# Patient Record
Sex: MALE | Race: WHITE | NOT HISPANIC OR LATINO | Employment: STUDENT | URBAN - METROPOLITAN AREA
[De-identification: names, ages, dates, MRNs, and addresses within clinical notes are randomized per-mention and may not be internally consistent; named-entity substitution may affect disease eponyms.]

---
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
## 2024-11-06 NOTE — PROGRESS NOTES
"Gerardo is a 13 y.o. year old male    Chief Complaint   Patient presents with   • Knee Injury     F/U R knee MRI results        History of Present Illness   HPI   1.  Follow-up right knee injury, patient states he has not had any more popping or pain posterior medial.  He has had for some time pain over the inferior pole patella bilaterally.  Worse after jumping.  Patient is a .  The mechanical symptoms of either knee.    I have reviewed the patient's medical, family, and social history in detail and updated the computerized patient record.    Review of Systems   Constitutional: Negative for fever.   Musculoskeletal:        Per HPI   Skin: Negative for wound.   Neurological: Negative for numbness.   All other systems reviewed and are negative.      /64 (BP Location: Left arm, Patient Position: Sitting, Cuff Size: Adult)   Ht 172.1 cm (67.76\")   Wt 76.2 kg (168 lb)   BMI 25.73 kg/m²      Physical Exam   Constitutional: He is oriented to person, place, and time. He appears well-developed and well-nourished.   HENT:   Head: Normocephalic and atraumatic.   Eyes: Conjunctivae and EOM are normal. Pupils are equal, round, and reactive to light.   Cardiovascular:   No peripheral edema   Pulmonary/Chest: Effort normal.   Musculoskeletal:   Patient has mild tenderness and crepitus over the inferior pole patella on the left at the patella tendon, minimal discomfort with resisted knee extension.    Right knee normal in general appearance, no effusion or erythema.  Negative Lockman, negative Cynthia.  Patient does have some mild discomfort to palpation over the inferior medial pole patella as well as over the patella tendon.  Negative Jd sign.   Neurological: He is alert and oriented to person, place, and time.   Skin: Skin is warm and dry.   Psychiatric: He has a normal mood and affect. His behavior is normal.   Vitals reviewed.    Reviewed MRI images right knee 2/25/2019, there is a significant " osteochondral lesion of the inferior medial pole of the patella.    Current Outpatient Medications:   •  albuterol (PROAIR RESPICLICK) 108 (90 BASE) MCG/ACT inhaler, Inhale 2 puffs Every 4 (Four) Hours As Needed for Wheezing., Disp: 1 inhaler, Rfl: 0  •  fluticasone (FLONASE) 50 MCG/ACT nasal spray, 1 spray into the nostril(s) as directed by provider Daily., Disp: , Rfl:   •  levalbuterol (XOPENEX HFA) 45 MCG/ACT inhaler, , Disp: , Rfl:   •  MYORISAN 40 MG capsule, , Disp: , Rfl: 0  •  ONEXTON 1.2-3.75 % gel, , Disp: , Rfl:      Diagnoses and all orders for this visit:    Osteochondral defect of patella  -     Ambulatory Referral to Orthopedic Surgery    Patellar tendinitis of left knee       1.  For patellar tendinitis left knee, suggest patella tendon strap and have given home exercise program.  2.  The osteochondral lesion right patella inferior medial pole appears old on MRI, this is consistent with his history of being a  diving for numerous balls in the past.  Because of his age and demand of his support I think we will get a orthopedic surgery referral consultation to see if anything further needs to be done with his osteochondral lesion.      EMR Dragon/Transcription disclaimer:    Much of this encounter note is an electronic transcription/translation of spoken language to printed text.  The electronic translation of spoken language may permit erroneous, or at times, nonsensical words or phrases to be inadvertently transcribed.  Although I have reviewed the note for such errors some may still exist.     daughter/children